# Patient Record
Sex: MALE | Race: OTHER | Employment: FULL TIME | URBAN - METROPOLITAN AREA
[De-identification: names, ages, dates, MRNs, and addresses within clinical notes are randomized per-mention and may not be internally consistent; named-entity substitution may affect disease eponyms.]

---

## 2022-03-23 ENCOUNTER — HOSPITAL ENCOUNTER (EMERGENCY)
Age: 22
Discharge: HOME | End: 2022-03-23

## 2022-03-23 VITALS
OXYGEN SATURATION: 100 % | DIASTOLIC BLOOD PRESSURE: 92 MMHG | TEMPERATURE: 97.7 F | HEART RATE: 80 BPM | HEIGHT: 67 IN | SYSTOLIC BLOOD PRESSURE: 132 MMHG | RESPIRATION RATE: 17 BRPM | BODY MASS INDEX: 33.74 KG/M2 | WEIGHT: 215 LBS

## 2022-03-23 DIAGNOSIS — S61.012A LACERATION OF LEFT THUMB WITHOUT FOREIGN BODY WITHOUT DAMAGE TO NAIL, INITIAL ENCOUNTER: ICD-10-CM

## 2022-03-23 DIAGNOSIS — S61.211A LACERATION OF LEFT INDEX FINGER WITHOUT FOREIGN BODY WITHOUT DAMAGE TO NAIL, INITIAL ENCOUNTER: Primary | ICD-10-CM

## 2022-03-23 PROCEDURE — 12001 RPR S/N/AX/GEN/TRNK 2.5CM/<: CPT

## 2022-03-23 PROCEDURE — 6360000002 HC RX W HCPCS: Performed by: PHYSICIAN ASSISTANT

## 2022-03-23 PROCEDURE — 90715 TDAP VACCINE 7 YRS/> IM: CPT | Performed by: PHYSICIAN ASSISTANT

## 2022-03-23 PROCEDURE — 99282 EMERGENCY DEPT VISIT SF MDM: CPT

## 2022-03-23 PROCEDURE — 90471 IMMUNIZATION ADMIN: CPT | Performed by: PHYSICIAN ASSISTANT

## 2022-03-23 RX ORDER — HYDROCODONE BITARTRATE AND ACETAMINOPHEN 5; 325 MG/1; MG/1
1 TABLET ORAL EVERY 6 HOURS PRN
Qty: 6 TABLET | Refills: 0 | Status: SHIPPED | OUTPATIENT
Start: 2022-03-23 | End: 2022-03-26

## 2022-03-23 RX ADMIN — TETANUS TOXOID, REDUCED DIPHTHERIA TOXOID AND ACELLULAR PERTUSSIS VACCINE, ADSORBED 0.5 ML: 5; 2.5; 8; 8; 2.5 SUSPENSION INTRAMUSCULAR at 16:12

## 2022-03-23 ASSESSMENT — PAIN DESCRIPTION - PAIN TYPE: TYPE: ACUTE PAIN

## 2022-03-23 ASSESSMENT — PAIN - FUNCTIONAL ASSESSMENT: PAIN_FUNCTIONAL_ASSESSMENT: 0-10

## 2022-03-23 ASSESSMENT — PAIN DESCRIPTION - LOCATION: LOCATION: HAND

## 2022-03-23 ASSESSMENT — PAIN DESCRIPTION - ORIENTATION: ORIENTATION: LEFT

## 2022-03-23 ASSESSMENT — PAIN SCALES - GENERAL: PAINLEVEL_OUTOF10: 5

## 2022-03-23 NOTE — ED NOTES
Left thumb and index finger laceration. Wound cleaned with hebiclens and saline irrigation.      Henna Goodman RN  03/23/22 0844

## 2022-03-23 NOTE — ED NOTES
Dressed sutures on patient's injured left index finger using a piece of non-adherent guaze wrapped in 2\" loose kerlix. Placed finger into a static aluminum finger splint and secured with 2\" Coban. Dressed sutures on the left thumb using a regular bandage. Patient tolerated well. Instructed patient of all at home care for the dressings and splint. Patient verbalizes understanding and had no additional questions or concerns.        Kiara Norton  03/23/22 2307

## 2022-03-23 NOTE — ED PROVIDER NOTES
[facility] Emergency Department    CHIEF COMPLAINT  Laceration (cut left thumb and index finger cutting drywall.)      SHARED SERVICE VISIT   Evaluated by AMARJIT. My supervising physician was available for consultation. HISTORY OF PRESENT ILLNESS  Ousmane Bains is a 23 y.o. male who presents to the ED complaining of a several hour history of laceration to left hand. Patient was using a utility knife when he cut hand at work. Reports burning pain at site exacerbated by touch and movement. Does not appear to radiate. He has had no numbness, tingling, weakness of the extremity. Right-hand-dominant. He does not believe he has ever had a tetanus shot. No other complaints, modifying factors or associated symptoms. Nursing notes reviewed. No past medical history on file. No past surgical history on file. No family history on file. Social History     Socioeconomic History    Marital status: Single     Spouse name: Not on file    Number of children: Not on file    Years of education: Not on file    Highest education level: Not on file   Occupational History    Not on file   Tobacco Use    Smoking status: Not on file    Smokeless tobacco: Not on file   Substance and Sexual Activity    Alcohol use: Not on file    Drug use: Not on file    Sexual activity: Not on file   Other Topics Concern    Not on file   Social History Narrative    Not on file     Social Determinants of Health     Financial Resource Strain:     Difficulty of Paying Living Expenses: Not on file   Food Insecurity:     Worried About Running Out of Food in the Last Year: Not on file    Ed of Food in the Last Year: Not on file   Transportation Needs:     Lack of Transportation (Medical): Not on file    Lack of Transportation (Non-Medical):  Not on file   Physical Activity:     Days of Exercise per Week: Not on file    Minutes of Exercise per Session: Not on file   Stress:     Feeling of Stress : Not on file   Social Connections:     Frequency of Communication with Friends and Family: Not on file    Frequency of Social Gatherings with Friends and Family: Not on file    Attends Congregational Services: Not on file    Active Member of Clubs or Organizations: Not on file    Attends Club or Organization Meetings: Not on file    Marital Status: Not on file   Intimate Partner Violence:     Fear of Current or Ex-Partner: Not on file    Emotionally Abused: Not on file    Physically Abused: Not on file    Sexually Abused: Not on file   Housing Stability:     Unable to Pay for Housing in the Last Year: Not on file    Number of [place] in the Last Year: Not on file    Unstable Housing in the Last Year: Not on file     No current facility-administered medications for this encounter. No current outpatient medications on file. No Known Allergies    REVIEW OF SYSTEMS  6 systems reviewed, pertinent positives per HPI otherwise noted to be negative    PHYSICAL EXAM  BP (!) 132/92   Pulse 80   Temp 97.7 °F (36.5 °C) (Oral)   Resp 17   Ht 5' 7\" (1.702 m)   Wt 215 lb (97.5 kg)   SpO2 100%   BMI 33.67 kg/m²   GENERAL APPEARANCE: Awake and alert. Cooperative. No acute distress. HEAD: Normocephalic. Atraumatic. EYES: PERRL. EOM's grossly intact. ENT: Mucous membranes are moist.   NECK: Supple. Normal ROM. CHEST: Equal symmetric chest rise. LUNGS: Breathing is unlabored. Speaking comfortably in full sentences. Abdomen: Nondistended  EXTREMITIES: MAEE. On exam of left hand 2 distinct wounds noted. 1 noted over the proximal phalanx of the thumb along posterior aspect. Bleeding well controlled without signs of foreign bodies. Measuring approximately 1.0 cm and full-thickness in nature. There is also a longitudinal laceration to the dorsum of the left index finger measuring approximately 5 cm. Wound extends from tip of digit to the PIP joint.   No true nail or nailbed injury although does involve cuticle slightly. Bleeding well controlled without signs of foreign bodies as well. Normal flexion and extension in digits against resistance. No loss of thumb-finger opposition. Radial pulses are +2 and cap refill remains less than 5 seconds. SKIN: Warm and dry. NEUROLOGICAL: Alert and oriented. Strength is 5/5 in all extremities and sensation is intact. Procedure Note:  Digital Block  The risks and benefits of an digital block were explained to the patient. [facility] or their surrogate had an opportunity to ask questions, and the risks, benefits, and alternatives were discussed. Patient verbally consented to the procedure. I mixed 1.5 mL of 2% lidocaine without epi and 1.5 mL of 0.5% bupivacaine. Using pt's proximal phalanx of the left thumb as landmark, I injected 1.0 mL of the mixture on each side of the digit (total 2.0 mL)  ESBL minimal. Complication none. Procedure Note:  Digital Block  The risks and benefits of an digital block were explained to the patient. [facility] or their surrogate had an opportunity to ask questions, and the risks, benefits, and alternatives were discussed. Patient verbally consented to the procedure. I mixed 1.5 mL of 2% lidocaine without epi and 1.5 mL of 0.5% bupivacaine. Using pt's proximal phalanx of the left index as landmark, I injected 1.0 mL of the mixture on each side of the digit (total 2.0 mL)  ESBL minimal. Complication none. PROCEDURE:  [facility] or their [facility] had an opportunity to ask questions, and the risks, benefits, and alternatives were discussed. The wound was prepped and draped to maintain a sterile field. A local anesthetic was used to completely anesthetize the wound. It was copiously irrigated. It was explored to its depth in a bloodless field with no sign of tendon, nerve, or vascular injury. The thumb wound was closed with 2, 5-0 Ethilon, simple interrupted sutures.  The index finger wound was closed with 8, 5-0 Ethilon, simple interrupted sutures. No foreign bodies were identified. There were no complications during the procedure. ED COURSE   Patient received local anesthetic for pain, with good relief. Wounds were cleansed and closed without complication. Sterile bandages applied. Tetanus booster updated. Patient discharged home with wound aftercare as well as recommendations for follow-up. He is in agreement and comfortable at discharge. A discussion was had with Mr. Vladimir Sanchez regarding finger lacerations, ED findings and recommendations for follow-up. Risk management discussed and shared decision making had with patient and/or surrogate. All questions were answered. Patient will follow up with PCP in 2 to 3 days for wound check and within 7 to 10 days for suture removal and for further evaluation/treatment. Patient will return to ED for new/worsening symptoms. Patient will take over-the-counter Tylenol and/or Motrin as needed for pain. MDM  I estimate there is LOW risk for CELLULITIS, COMPARTMENT SYNDROME, NECROTIZING FASCIITIS, TENDON OR NEUROVASCULAR INJURY, or FOREIGN BODY, thus I consider the discharge disposition reasonable. Also, there is no evidence or peritonitis, sepsis, or toxicity. Federico and I have discussed the diagnosis and risks, and we agree with discharging home to follow-up with their primary doctor. We also discussed returning to the Emergency Department immediately if new or worsening symptoms occur. We have discussed the symptoms which are most concerning (e.g., changing or worsening pain, fever, numbness, weakness, cool or painful digits) that necessitate immediate return. Final Impression  1. Laceration of left index finger without foreign body without damage to nail, initial encounter    2.  Laceration of left thumb without foreign body without damage to nail, initial encounter      Discharge Vital Signs:  Blood pressure (!) 132/92, pulse 80, temperature 97.7 °F (36.5 °C), temperature source Oral, resp. rate 17, height 5' 7\" (1.702 m), weight 215 lb (97.5 kg), SpO2 100 %. DISPOSITION  Patient was discharged to home in good condition.           Lizet Betancourtma  03/23/22 0239